# Patient Record
Sex: MALE | Race: WHITE
[De-identification: names, ages, dates, MRNs, and addresses within clinical notes are randomized per-mention and may not be internally consistent; named-entity substitution may affect disease eponyms.]

---

## 2021-05-13 ENCOUNTER — HOSPITAL ENCOUNTER (EMERGENCY)
Dept: HOSPITAL 61 - ER | Age: 31
LOS: 1 days | Discharge: HOME | End: 2021-05-14
Payer: COMMERCIAL

## 2021-05-13 VITALS — BODY MASS INDEX: 33.92 KG/M2 | WEIGHT: 250.45 LBS | HEIGHT: 72 IN

## 2021-05-13 DIAGNOSIS — F17.200: ICD-10-CM

## 2021-05-13 DIAGNOSIS — N20.0: Primary | ICD-10-CM

## 2021-05-13 PROCEDURE — 96374 THER/PROPH/DIAG INJ IV PUSH: CPT

## 2021-05-13 PROCEDURE — 85025 COMPLETE CBC W/AUTO DIFF WBC: CPT

## 2021-05-13 PROCEDURE — 83690 ASSAY OF LIPASE: CPT

## 2021-05-13 PROCEDURE — 80053 COMPREHEN METABOLIC PANEL: CPT

## 2021-05-13 PROCEDURE — 80307 DRUG TEST PRSMV CHEM ANLYZR: CPT

## 2021-05-13 PROCEDURE — 81001 URINALYSIS AUTO W/SCOPE: CPT

## 2021-05-13 PROCEDURE — 74177 CT ABD & PELVIS W/CONTRAST: CPT

## 2021-05-13 PROCEDURE — 99285 EMERGENCY DEPT VISIT HI MDM: CPT

## 2021-05-13 PROCEDURE — 96361 HYDRATE IV INFUSION ADD-ON: CPT

## 2021-05-13 PROCEDURE — 36415 COLL VENOUS BLD VENIPUNCTURE: CPT

## 2021-05-13 PROCEDURE — 96375 TX/PRO/DX INJ NEW DRUG ADDON: CPT

## 2021-05-14 VITALS — DIASTOLIC BLOOD PRESSURE: 77 MMHG | SYSTOLIC BLOOD PRESSURE: 162 MMHG

## 2021-05-14 LAB
ALBUMIN SERPL-MCNC: 4.1 G/DL (ref 3.4–5)
ALBUMIN/GLOB SERPL: 1.3 {RATIO} (ref 1–1.7)
ALP SERPL-CCNC: 57 U/L (ref 46–116)
ALT SERPL-CCNC: 67 U/L (ref 16–63)
AMPHETAMINE/METHAMPHETAMINE: (no result)
ANION GAP SERPL CALC-SCNC: 10 MMOL/L (ref 6–14)
APTT PPP: YELLOW S
AST SERPL-CCNC: 30 U/L (ref 15–37)
BACTERIA #/AREA URNS HPF: 0 /HPF
BARBITURATES UR-MCNC: (no result) UG/ML
BASOPHILS # BLD AUTO: 0.1 X10^3/UL (ref 0–0.2)
BASOPHILS NFR BLD: 1 % (ref 0–3)
BENZODIAZ UR-MCNC: (no result) UG/L
BILIRUB SERPL-MCNC: 0.6 MG/DL (ref 0.2–1)
BILIRUB UR QL STRIP: NEGATIVE
BUN SERPL-MCNC: 14 MG/DL (ref 8–26)
BUN/CREAT SERPL: 12 (ref 6–20)
CALCIUM SERPL-MCNC: 9 MG/DL (ref 8.5–10.1)
CANNABINOIDS UR-MCNC: (no result) UG/L
CHLORIDE SERPL-SCNC: 107 MMOL/L (ref 98–107)
CO2 SERPL-SCNC: 26 MMOL/L (ref 21–32)
COCAINE UR-MCNC: (no result) NG/ML
CREAT SERPL-MCNC: 1.2 MG/DL (ref 0.7–1.3)
EOSINOPHIL NFR BLD: 0.6 X10^3/UL (ref 0–0.7)
EOSINOPHIL NFR BLD: 6 % (ref 0–3)
ERYTHROCYTE [DISTWIDTH] IN BLOOD BY AUTOMATED COUNT: 13.8 % (ref 11.5–14.5)
FIBRINOGEN PPP-MCNC: CLEAR MG/DL
GFR SERPLBLD BASED ON 1.73 SQ M-ARVRAT: 70.6 ML/MIN
GLUCOSE SERPL-MCNC: 93 MG/DL (ref 70–99)
HCT VFR BLD CALC: 43.3 % (ref 39–53)
HGB BLD-MCNC: 14.8 G/DL (ref 13–17.5)
LIPASE: 170 U/L (ref 73–393)
LYMPHOCYTES # BLD: 3.4 X10^3/UL (ref 1–4.8)
LYMPHOCYTES NFR BLD AUTO: 34 % (ref 24–48)
MCH RBC QN AUTO: 30 PG (ref 25–35)
MCHC RBC AUTO-ENTMCNC: 34 G/DL (ref 31–37)
MCV RBC AUTO: 87 FL (ref 79–100)
METHADONE SERPL-MCNC: (no result) NG/ML
MONO #: 0.8 X10^3/UL (ref 0–1.1)
MONOCYTES NFR BLD: 8 % (ref 0–9)
NEUT #: 5.2 X10^3/UL (ref 1.8–7.7)
NEUTROPHILS NFR BLD AUTO: 52 % (ref 31–73)
NITRITE UR QL STRIP: NEGATIVE
OPIATES UR-MCNC: (no result) NG/ML
PCP SERPL-MCNC: (no result) MG/DL
PH UR STRIP: 5.5 [PH]
PLATELET # BLD AUTO: 326 X10^3/UL (ref 140–400)
POTASSIUM SERPL-SCNC: 4.2 MMOL/L (ref 3.5–5.1)
PROT SERPL-MCNC: 7.3 G/DL (ref 6.4–8.2)
PROT UR STRIP-MCNC: NEGATIVE MG/DL
RBC # BLD AUTO: 4.98 X10^6/UL (ref 4.3–5.7)
SODIUM SERPL-SCNC: 143 MMOL/L (ref 136–145)
UROBILINOGEN UR-MCNC: 1 MG/DL
WBC # BLD AUTO: 10 X10^3/UL (ref 4–11)
WBC #/AREA URNS HPF: (no result) /HPF (ref 0–4)

## 2021-05-14 NOTE — RAD
EXAM: CT ABDOMEN/PELVIS WITH CONTRAST.



HISTORY: Right lower quadrant pain.



TECHNIQUE: Computed tomography of the abdomen and pelvis was performed after the intravenous administ
ration of iodinated contrast. One or more of the following individualized dose reduction techniques w
ere utilized for this examination:  

1. Automated exposure control.  

2. Adjustment of the mA and/or kV according to patient size.  

3. Use of iterative reconstruction technique.



COMPARISON: None.



FINDINGS: Lung windows through the visualized portions of the bases reveal mild atelectasis. Bone win
dows reveal no suspicious lesions.



Contrast enhancement is suboptimal, limiting parenchymal assessment. The appendix is not inflamed. Th
ere are no inflammatory changes in the right lower quadrant or elsewhere. There is no small bowel obs
truction.



The liver, gallbladder, pancreas, adrenal glands and spleen are unremarkable.



A right ureterovesical junction calculus measures 3 mm. The right kidney enhances less avidly than th
e left consistent with acute obstruction. There is only mild pelvic caliectasis. Additional bilateral
 renal calculi measure 2 mm or less.



IMPRESSION: 

1. 3 mm right ureterovesical junction calculus with mild proximal obstructive findings.

2. Additional bilateral renal calculi measure 2 mm or less.



Electronically signed by: RICARDA Oquendo MD (5/14/2021 1:19 AM) [facility]

## 2021-05-14 NOTE — ED.ADGEN
Past Medical History


Past Medical History:  No Pertinent History


Past Surgical History:  Other


Additional Past Surgical Histo:  back surgery x3


Smoking Status:  Current Every Day Smoker


Alcohol Use:  None





General Adult


EDM:


Chief Complaint:  ABDOMINAL PAIN





HPI:


HPI:





Patient is a 31  year old male coming in for right mid abdominal pain that 

radiates to his right back and to his testicles.  Denies having any hematuria or

dysuria.  Has a history of multiple kidney stones.  Patient states he has a 

family history of "spongy kidneys".  Has urologist has not seen him in some 

time.  No vomiting, diarrhea, constipation.





Review of Systems:


Review of Systems:


All other systems within normal limits except for as noted in the HPI





Current Medications:





Current Medications








 Medications


  (Trade)  Dose


 Ordered  Sig/Karen  Start Time


 Stop Time Status Last Admin


Dose Admin


 


 Hydromorphone HCl


  (Dilaudid)  2 mg  1X  ONCE  5/14/21 00:30


 5/14/21 00:31 DC 5/14/21 00:34


2 MG


 


 Info


  (CONTRAST GIVEN


 -- Rx MONITORING)  1 each  PRN DAILY  PRN  5/14/21 01:00


 5/16/21 00:59   





 


 Iohexol


  (Omnipaque 300


 Mg/ml)  75 ml  1X  ONCE  5/14/21 01:00


 5/14/21 01:01 DC 5/14/21 01:02


75 ML


 


 Ketorolac


 Tromethamine


  (Toradol 15mg


 Vial)  30 mg  1X  ONCE  5/14/21 01:30


 5/14/21 01:31 DC 5/14/21 01:42


30 MG


 


 Morphine Sulfate


  (Morphine


 Sulfate)  4 mg  1X  ONCE  5/14/21 00:00


 5/14/21 00:02 DC 5/14/21 00:10


4 MG


 


 Ondansetron HCl


  (Zofran)  4 mg  1X  ONCE  5/14/21 00:00


 5/14/21 00:02 DC 5/14/21 00:11


4 MG


 


 Oxycodone/


 Acetaminophen


  (Percocet 10/325)  1 tab  1X  ONCE  5/14/21 02:00


 5/14/21 02:01   





 


 Sodium Chloride  1,000 ml @ 


 1,000 mls/hr  1X  ONCE  5/14/21 00:15


 5/14/21 01:14 DC 5/14/21 00:11


1,000 MLS/HR


 


 Tamsulosin HCl


  (Flomax)  0.4 mg  1X  ONCE  5/14/21 02:00


 5/14/21 02:01   














Allergies:


Allergies:





Allergies








Coded Allergies Type Severity Reaction Last Updated Verified


 


  No Known Drug Allergies    5/14/21 No











Physical Exam:


PE:


Constitutional: Well developed, well nourished, no acute distress, non-toxic 

appearance. []


HENT: Normocephalic, atraumatic, bilateral external ears normal,  nose normal. [

]


Eyes: PERRLA, conjunctiva normal, no discharge. [] 


Neck: No rigidity, supple, no stridor. [] 


Cardiovascular: Regular rate and rhythm, brisk cap refill []


Lungs & Thorax: Non labored symmetric respirations, no tachypnea or respiratory 

distress []


Abdomen: Soft, nondistended.


Skin: Warm, dry, no erythema, no rash. [] 


Back: Unremarkable


Extremities: No deformities, range of motion grossly intact, no lower extremity 

edema [] 


Neurologic: Alert and oriented X 3, no focal deficits noted. []


Psychologic: Affect normal, judgement normal, mood normal. []





Current Patient Data:


Labs:





                                Laboratory Tests








Test


 5/14/21


00:01 5/14/21


00:05


 


White Blood Count


 10.0 x10^3/uL


(4.0-11.0) 





 


Red Blood Count


 4.98 x10^6/uL


(4.30-5.70) 





 


Hemoglobin


 14.8 g/dL


(13.0-17.5) 





 


Hematocrit


 43.3 %


(39.0-53.0) 





 


Mean Corpuscular Volume


 87 fL ()


 





 


Mean Corpuscular Hemoglobin 30 pg (25-35)   


 


Mean Corpuscular Hemoglobin


Concent 34 g/dL


(31-37) 





 


Red Cell Distribution Width


 13.8 %


(11.5-14.5) 





 


Platelet Count


 326 x10^3/uL


(140-400) 





 


Neutrophils (%) (Auto) 52 % (31-73)   


 


Lymphocytes (%) (Auto) 34 % (24-48)   


 


Monocytes (%) (Auto) 8 % (0-9)   


 


Eosinophils (%) (Auto) 6 % (0-3)  H 


 


Basophils (%) (Auto) 1 % (0-3)   


 


Neutrophils # (Auto)


 5.2 x10^3/uL


(1.8-7.7) 





 


Lymphocytes # (Auto)


 3.4 x10^3/uL


(1.0-4.8) 





 


Monocytes # (Auto)


 0.8 x10^3/uL


(0.0-1.1) 





 


Eosinophils # (Auto)


 0.6 x10^3/uL


(0.0-0.7) 





 


Basophils # (Auto)


 0.1 x10^3/uL


(0.0-0.2) 





 


Sodium Level


 143 mmol/L


(136-145) 





 


Potassium Level


 4.2 mmol/L


(3.5-5.1) 





 


Chloride Level


 107 mmol/L


() 





 


Carbon Dioxide Level


 26 mmol/L


(21-32) 





 


Anion Gap 10 (6-14)   


 


Blood Urea Nitrogen


 14 mg/dL


(8-26) 





 


Creatinine


 1.2 mg/dL


(0.7-1.3) 





 


Estimated GFR


(Cockcroft-Gault) 70.6  


 





 


BUN/Creatinine Ratio 12 (6-20)   


 


Glucose Level


 93 mg/dL


(70-99) 





 


Calcium Level


 9.0 mg/dL


(8.5-10.1) 





 


Total Bilirubin


 0.6 mg/dL


(0.2-1.0) 





 


Aspartate Amino Transferase


(AST) 30 U/L (15-37)


 





 


Alanine Aminotransferase (ALT)


 67 U/L (16-63)


H 





 


Alkaline Phosphatase


 57 U/L


() 





 


Total Protein


 7.3 g/dL


(6.4-8.2) 





 


Albumin


 4.1 g/dL


(3.4-5.0) 





 


Albumin/Globulin Ratio 1.3 (1.0-1.7)   


 


Lipase


 170 U/L


() 





 


Ethyl Alcohol Level


 < 10 mg/dL


(0-10) 





 


Urine Collection Type  Unknown  


 


Urine Color  Yellow  


 


Urine Clarity  Clear  


 


Urine pH


 


 5.5 (<5.0-8.0)





 


Urine Specific Gravity


 


 1.025


(1.000-1.030)


 


Urine Protein


 


 Negative mg/dL


(NEG-TRACE)


 


Urine Glucose (UA)


 


 Negative mg/dL


(NEG)


 


Urine Ketones (Stick)


 


 Negative mg/dL


(NEG)


 


Urine Blood  Large (NEG)  


 


Urine Nitrite


 


 Negative (NEG)





 


Urine Bilirubin


 


 Negative (NEG)





 


Urine Urobilinogen Dipstick


 


 1.0 mg/dL (0.2


mg/dL)


 


Urine Leukocyte Esterase


 


 Negative (NEG)





 


Urine RBC


 


 11-20 /HPF


(0-2)


 


Urine WBC


 


 1-4 /HPF (0-4)





 


Urine Squamous Epithelial


Cells 


 Occ /LPF  





 


Urine Bacteria


 


 0 /HPF (0-FEW)





 


Urine Mucus  Slight /LPF  


 


Urine Opiates Screen  Neg (NEG)  


 


Urine Methadone Screen  Neg (NEG)  


 


Urine Barbiturates  Neg (NEG)  


 


Urine Phencyclidine Screen  Neg (NEG)  


 


Urine


Amphetamine/Methamphetamine 


 Pos (NEG)  





 


Urine Benzodiazepines Screen  Neg (NEG)  


 


Urine Cocaine Screen  Neg (NEG)  


 


Urine Cannabinoids Screen  Neg (NEG)  


 


Urine Ethyl Alcohol  Neg (NEG)  





                                Laboratory Tests


5/14/21 00:01








                                Laboratory Tests


5/14/21 00:01











Vital Signs:





                                   Vital Signs








  Date Time  Temp Pulse Resp B/P (MAP) Pulse Ox O2 Delivery O2 Flow Rate FiO2


 


5/14/21 00:49  83 26 173/136 (148) 98 Room Air  


 


5/14/21 00:13 97.7       





 97.7       











EKG:


EKG:


[]





Heart Score:


C/O Chest Pain:  No


Risk Factors:


Risk Factors:  DM, Current or recent (<one month) smoker, HTN, HLP, family 

history of CAD, obesity.


Risk Scores:


Score 0 - 3:  2.5% MACE over next 6 weeks - Discharge Home


Score 4 - 6:  20.3% MACE over next 6 weeks - Admit for Clinical Observation


Score 7 - 10:  72.7% MACE over next 6 weeks - Early Invasive Strategies





Radiology/Procedures:


Radiology/Procedures:





EXAM: CT ABDOMEN/PELVIS WITH CONTRAST.





HISTORY: Right lower quadrant pain.





TECHNIQUE: Computed tomography of the abdomen and pelvis was performed after the

 intravenous administration of iodinated contrast. One or more of the following 

individualized dose reduction techniques were utilized for this examination:  


1. Automated exposure control.  


2. Adjustment of the mA and/or kV according to patient size.  


3. Use of iterative reconstruction technique.





COMPARISON: None.





FINDINGS: Lung windows through the visualized portions of the bases reveal mild 

atelectasis. Bone windows reveal no suspicious lesions.





Contrast enhancement is suboptimal, limiting parenchymal assessment. The 

appendix is not inflamed. There are no inflammatory changes in the right lower 

quadrant or elsewhere. There is no small bowel obstruction.





The liver, gallbladder, pancreas, adrenal glands and spleen are unremarkable.





A right ureterovesical junction calculus measures 3 mm. The right kidney 

enhances less avidly than the left consistent with acute obstruction. There is 

only mild pelvic caliectasis. Additional bilateral renal calculi measure 2 mm or

 less.





IMPRESSION: 


1. 3 mm right ureterovesical junction calculus with mild proximal obstructive 

findings.


2. Additional bilateral renal calculi measure 2 mm or less.[]





Course & Med Decision Making:


Course & Med Decision Making


Pertinent Labs and Imaging studies reviewed. (See chart for details)





[]





Dragon Disclaimer:


Dragon Disclaimer:


This electronic medical record was generated, in whole or in part, using a voice

 recognition dictation system.





Departure


Departure


Impression:  


   Primary Impression:  


   Kidney stone on right side


Disposition:  01 HOME / SELF CARE / HOMELESS


Condition:  STABLE


Referrals:  


NO PCP (PCP)


Patient Instructions:  Diet for Kidney Stones


Scripts


Ibuprofen (IBUPROFEN) 800 Mg Tablet


800 MG PO PRN Q8HRS PRN for INFLAMMATION, #20 TAB


   Prov: QAMAR GUZMAN MD         5/14/21 


Oxycodone/Apap  (PERCOCET  MG TABLET **) 1 Each Tablet


1 TAB PO PRN Q6HRS PRN for PAIN for 5 Days, #20 TAB 0 Refills


   Prov: QAMAR GUZMAN MD         5/14/21 


Tamsulosin Hcl (FLOMAX) 0.4 Mg Cap.er.24h


1 CAP PO DAILY for kidney stone for 10 Days, #10 CAP 11 Refills


   Prov: QAMAR GUZMAN MD         5/14/21











QAMAR GUZMAN MD            May 14, 2021 01:58

## 2021-05-24 ENCOUNTER — HOSPITAL ENCOUNTER (EMERGENCY)
Dept: HOSPITAL 61 - ER | Age: 31
Discharge: HOME | End: 2021-05-24
Payer: COMMERCIAL

## 2021-05-24 VITALS — WEIGHT: 200.62 LBS | HEIGHT: 72 IN | BODY MASS INDEX: 27.17 KG/M2

## 2021-05-24 VITALS — DIASTOLIC BLOOD PRESSURE: 65 MMHG | SYSTOLIC BLOOD PRESSURE: 145 MMHG

## 2021-05-24 DIAGNOSIS — L03.114: Primary | ICD-10-CM

## 2021-05-24 DIAGNOSIS — F17.200: ICD-10-CM

## 2021-05-24 PROCEDURE — 99283 EMERGENCY DEPT VISIT LOW MDM: CPT

## 2021-05-24 NOTE — PHYS DOC
Past Medical History


Past Medical History:  No Pertinent History


Past Surgical History:  Other


Additional Past Surgical Histo:  back surgery x3


Smoking Status:  Current Every Day Smoker


Alcohol Use:  None





General Adult


EDM:


Chief Complaint:  INSECT BITE





HPI:


HPI:


31-year-old male past medical history significant for obesity and history of 

pilonidal abscess, presents the ED with complaints of red, warm rash of her left

forearm that started 2 days ago.  Patient reports he has dry skin and believes 

the injury was due to scratching his skin-no recall of any bug/dog/cat/mammalian

bites. Patient is right-hand dominant.  Tetanus updated 4 years ago.  No known 

history of MRSA.   Believes he injured his arm due to scratching it. States his 

gf is an nurse and referred him here. Rash was only 3cm wide yesterday. Pt 

states "I've always had a fast heart rate."  EMR reviewed patient's rate was 

8310 days ago when seen in ED.





Review of Systems:


Review of Systems:


Constitutional:   Denies fever or chills. []


Eyes:   Denies change in visual acuity. []


HENT:   Denies nasal congestion or sore throat. [] 


Respiratory:   Denies cough or shortness of breath. [] 


Cardiovascular:   Denies chest pain or edema. [] 


GI:   Denies abdominal pain, nausea, vomiting, bloody stools or diarrhea. [] 


:  Denies dysuria or hematuria


Musculoskeletal:   Denies back pain or joint pain. [] 


Integument:   Denies diaphoresis or sinusitis


Neurologic:   Denies headache, focal weakness or sensory changes. [] 


Endocrine:   Denies polyuria or polydipsia. [] 


Lymphatic:  Denies swollen glands. [] 


Psychiatric:  Denies depression or anxiety. []





Heart Score:


C/O Chest Pain:  No


Risk Factors:


Risk Factors:  DM, Current or recent (<one month) smoker, HTN, HLP, family 

history of CAD, obesity.


Risk Scores:


Score 0 - 3:  2.5% MACE over next 6 weeks - Discharge Home


Score 4 - 6:  20.3% MACE over next 6 weeks - Admit for Clinical Observation


Score 7 - 10:  72.7% MACE over next 6 weeks - Early Invasive Strategies





Allergies:


Allergies:





Allergies








Coded Allergies Type Severity Reaction Last Updated Verified


 


  No Known Drug Allergies    5/14/21 No











Physical Exam:


PE:





Constitutional: Well developed, well nourished, no acute distress, non-toxic 

appearance, obese


HENT: Normocephalic, atraumatic,


Eyes: EOMI, conjunctiva normal, no discharge.  


Neck: Normal range of motion,  supple, 


Cardiovascular: S1/2 present,  and 94 during my exam


Lungs & Thorax: Speaking in full sentences, bilateral equal chest rise, no 

tachypnea or increased work of breathing


Abdomen:  soft, no tenderness, 


Skin: Warm, dry, 12 x 6 cm area of erythema and increased warmth over dorsal 

aspect of left forearm, no underlying fluctuance or subcutaneous emphysema, 

normal radial brachial pulses bilaterally,


Back: No midline pain, no rash, no pilonidal abscess


Extremities: No tenderness, no cyanosis, no lower extremity edema


Neurologic: Alert and oriented X 3, normal motor function, normal sensory 

function, no focal deficits noted. []


Psychologic: Affect normal, judgement normal, mood normal. []





EKG:


EKG:


[]





Radiology/Procedures:


Radiology/Procedures:


[]





Course & Med Decision Making:


Course & Med Decision Making


Pertinent Labs and Imaging studies reviewed. (See chart for details)





Concern for left forearm cellulitis approximately 12 x 6 cm-does not cross over 

any joints, does not involve wrist or left elbow joint.  Radial brachial pulse 

intact with normal skin turgor and cap refill less than 1 second.  Will cover 

for community-acquired MRSA, recommend warm compresses (was educated on 

possibility of abscess formation vs compartment syndrome) pt denies any 

cat/dog/mammalian bites. Reports Tetanus is UTD.  Will discharge home with stri

ct ED return precautions were given for worsening rash, fever, lethargy, 

confusion, neurologic deficits, compartment syndrome or rash involving joint. 

Encouraged urgent outpatient follow-up with PMD/wound care for wound check in 48

 hours.  Life-threatening processes were considered but are low suspicion at 

this time, given history, physical exam and ED workup. Pt was educated on all 

prescription medications and adverse effects.  All patient's questions were 

answered and pt was stable at time of discharge.





Life/limb-threatening differential includes but is not limited to, erythema 

multiforme, abdullahi-mariel syndrome, toxic epidermal necrolysis, staphylococcal

 scalded skin syndrome, necrotizing fasciitis/myositis/cellulitis, purpura 

fulminans, heparin or warfarin induced skin necrosis, angioedema, anaphylaxis 

drug rash, disseminated intravascular coagulation, disseminated gonococcal 

disease, vasculitis, septicemia, petechial disorder or coagulopathy, viral 

exanthem, Kawasaki's disease or life-threatening burn requiring burn center 

management or escharotomy.





I spoken with the patient and her caregivers.  I explained the patient's cond

ition, diagnoses and treatment plan based on the information available to me at 

this time.  I have answered the patient and her caregiver's questions and 

addressed any concerns.  The patient and her caregivers have a good 

understanding of patient's diagnosis, condition and treatment plan as can be 

expected at this point.  Vital signs have been stable.  Patient's condition is 

stable and appropriate for discharge from the emergency department. 





Patient will pursue further outpatient evaluation with primary care physician or

 other designated or consulting physician as outlined in the discharge 

instructions.  The patient and/or caregivers are agreeable to this plan of care 

and follow-up instructions have been explained in detail.  The patient and/or 

caregivers have received these instructions in written form and have expressed 

an understanding of the discharge instructions.  The patient and/or caregivers 

are aware that any significant change of condition or worsening of symptoms 

should prompt immediate return to this or the closest emergency department or 

call to [facility] Disclaimer:


Dragon Disclaimer:


This electronic medical record was generated, in whole or in part, using a voice

 recognition dictation system.





Departure


Departure


Impression:  


   Primary Impression:  


   Cellulitis of left forearm


Disposition:  01 HOME / SELF CARE / HOMELESS


Condition:  IMPROVED


Referrals:  


NO PCP (PCP)


Follow-up in 48 hours for wound check with your PCP or


FOLLOW UP WITH FAMILY MEDICINE:


[address]


[place] [address]


Phone: [phone]


Patient Instructions:  Cellulitis, Community-Associated MRSA





Additional Instructions:  


FOLLOW UP WITH WOUND CARE: Wound check in 48 hours


[facility]


Wound Care Center


[address], [address]


[place] [address]


Phone: [phone]





EMERGENCY DEPARTMENT GENERAL DISCHARGE INSTRUCTIONS





Thank you for coming to [facility] Emergency Department (ED) 

today and 


trusting us with you care.  We trust that you had a positive experience in our 

Emergency 


Department.  If you wish to speak to the department management, you may call the

 Director at 


[phone].





YOUR FOLLOW UP INSTRUCTIONS ARE AS FOLLOWS:





1.  Do you have a private Doctor?  If you do not have a private doctor, please 

ask for a 


resource list of physicians or clinics that may be able to assist you with 

follow up care.





2.  The Emergency Physicain has interpreted your x-rays.  The X-Ray specialist 

will also 


review them.  If there is a change in the findings, you will be notified in 48 

hours when at 


all possible.





3.  A lab test or culture has been done, your results will be reviewed and you 

will be 


notified if you need a change in treatment.





ADDITIONAL INSTRUCTIONS AND INFORMATION:





1.  Your care today has been supervised by a physician who is specially trained 

in emergency 


care.  Many problems require more than one evaluation for a complete diagnosis 

and 


treatment.  We recommend that you schedule your follow up appointment as 

recommended to 


ensure complete treatment of you illness or injury.  If you are unable to obtain

 follow up 


care and continue to have a problem, or if your condition worsens, we recommend 

that you 


return to the ED.





2.  We are not able to safely determine your condition over the phone nor are we

 able to 


give sound medical advice over the phone.  For these safety reasons, if you call

 for medical 


advice we will ask you to come to the ED for further evaluation.





3.  If you have any questions regarding these discharge instructions please call

 the ED at 


[phone].





SAFETY INFORMATION:





In the interest of safety, wellness, and injury prevention; we encourage you to 

wear your 


sealbelt, if you smoke; quite smoking, and we encourage family to use a 

protective helmet 


for bicycling and other sporting events that present an increased risk for head 

injury.





IF YOUR SYMPTOMS WORSEN OR NEW SYMPTOMS DEVELOP, OR YOU HAVE CONCERNS ABOUT YOUR

 CONDITION; 


OR IF YOUR CONDITION WORSENS WHILE YOU ARE WAITING FOR YOUR FOLLOW UP 

APPOINTMENT; EITHER 


CONTACT YOUR PRIMARY CARE DOCTOR, THE PHYSICIAN WHOSE NAME AND NUMBER YOU WERE 

GIVEN, OR 


RETURN TO THE ED IMMEDIATELY.


Scripts


Sulfamethoxazole/Trimethoprim (BACTRIM DS TABLET) 1 Each Tablet


1 TAB PO BID for infection for 10 Days, #20 TAB


   Prov: NORMA GUERRERO DO         5/24/21 


Cephalexin (CEPHALEXIN) 500 Mg Capsule


1 CAP PO QID for 10 Days, #40 CAP


   Prov: NORMA GUERRERO DO         5/24/21











NORMA GUERRERO DO               May 24, 2021 12:57